# Patient Record
Sex: MALE | Race: WHITE
[De-identification: names, ages, dates, MRNs, and addresses within clinical notes are randomized per-mention and may not be internally consistent; named-entity substitution may affect disease eponyms.]

---

## 2017-05-19 ENCOUNTER — HOSPITAL ENCOUNTER (EMERGENCY)
Dept: HOSPITAL 75 - ER | Age: 32
Discharge: HOME | End: 2017-05-19
Payer: SELF-PAY

## 2017-05-19 VITALS — DIASTOLIC BLOOD PRESSURE: 72 MMHG | SYSTOLIC BLOOD PRESSURE: 132 MMHG

## 2017-05-19 VITALS — HEIGHT: 71 IN | BODY MASS INDEX: 22.4 KG/M2 | WEIGHT: 160 LBS

## 2017-05-19 DIAGNOSIS — F17.210: ICD-10-CM

## 2017-05-19 DIAGNOSIS — M47.817: ICD-10-CM

## 2017-05-19 DIAGNOSIS — M54.5: Primary | ICD-10-CM

## 2017-05-19 PROCEDURE — 72131 CT LUMBAR SPINE W/O DYE: CPT

## 2017-05-19 PROCEDURE — 99281 EMR DPT VST MAYX REQ PHY/QHP: CPT

## 2017-05-19 PROCEDURE — 96372 THER/PROPH/DIAG INJ SC/IM: CPT

## 2017-05-19 NOTE — DIAGNOSTIC IMAGING REPORT
PROCEDURE: CT lumbar spine without contrast.



TECHNIQUE: Multiple contiguous axial images were obtained through

the lumbar spine without the use of intravenous contrast.

Sagittal and coronal reformations were then performed.



INDICATION: Injury, back pain.



There are no prior studies available for comparison.



FINDINGS: The reconstructed sagittal images show the vertebral

body heights and alignment to be generally within normal limits.

There is narrowing of the disc space at L5-S1. There is also a

broad-based disc bulge centrally at this level. The disc flattens

the ventral aspect of the thecal sac and narrows the AP diameter

to approximately 11.8 mm. There also appears to be some narrowing

of the neuroforamen bilaterally.



There is also a disc bulge centrally at the L4-L5 level. The AP

diameter of the thecal sac is narrowed to approximately 11 mm.

There is mild narrowing of the neuroforamen bilaterally at this

level.



The remainder of the lumbar spine is unremarkable for spinal

stenosis or nerve root encroachment.



There is no fracture or acute bony abnormality evident.



There is no sign of a paraspinal mass.



IMPRESSION:

1. There is no evidence for an acute bony abnormality.

2. There is degenerative disc disease at L5-S1 and to a lesser

extent at L4-L5. There is no evidence for central stenosis at

either of these two levels but there is narrowing of the

neuroforamen bilaterally at L5-S1.

3. If further imaging of the lumbar spine is desired, then MRI

would be recommended.



Dictated by: 



  Dictated on workstation # [id number]

## 2017-05-19 NOTE — ED BACK PAIN
General


Chief Complaint:  Back Problems


Stated Complaint:  BACK PAIN


Nursing Triage Note:  


WAS PICKING UP TIRES. FELT A POP, THEN SHARP PAIN TO LOWER BACK.


Nursing Sepsis Screen:  No Definite Risk


Source of Information:  Patient


Exam Limitations:  No Limitations





History of Present Illness


Time Seen by Provider:  16:04


Initial Comments


States he was picking up tires yesterday afternoon at 1 p.m.  Upon lifting his 

fourth tire he felt a popping sensation in his low back and is unable to stand 

up straight due to the pain.  No loss of sensation of his genitals.  No loss of 

bowel or bladder control.  Pain does radiate down the posterior right thigh.  

Never had any history of back problems with this pain before.


Location:  Lumbar Spine


Timing/Duration:  2-3 Days


Severity:  Moderate


Associated Symptoms:  denies symptoms





Allergies and Home Medications


Allergies


Coded Allergies:  


     No Known Drug Allergies (Unverified , 5/19/17)





Home Medications


No Active Prescriptions or Reported Meds





Constitutional:  see HPI, No chills, No fever


EENTM:  see HPI


Respiratory:  no symptoms reported


Cardiovascular:  no symptoms reported


Genitourinary:  no symptoms reported


Musculoskeletal:  see HPI, back pain


Skin:  no symptoms reported


Psychiatric/Neurological:  No Symptoms Reported





Past Medical-Social-Family Hx


Patient Social History


Alcohol Use:  Denies Use


Recreational Drug Use:  Yes


Drug of Choice:  MARIJUANA


Smoking Status:  Current Everyday Smoker


Recent Foreign Travel:  No


Contact w/Someone Who Travel:  No


Recent Infectious Disease Expo:  No


Recent Hopitalizations:  No





Physical Exam


Vital Signs





Vital Sign - Last 12Hours








 5/19/17





 14:43


 


Temp 99.1


 


Pulse 97


 


Resp 18


 


Pulse Ox 96


 


O2 Delivery Room Air





Capillary Refill : Less Than 3 Seconds


General Appearance:  No Apparent Distress, WD/WN


HEENT:  PERRL/EOMI, TMs Normal


Neck:  Full Range of Motion, Normal Inspection


Respiratory:  Normal Breath Sounds, No Accessory Muscle Use, No Respiratory 

Distress


Gastrointestinal:  Normal Bowel Sounds, Non Tender, Soft


Neurologic/Psychiatric:  Alert, Oriented x3, No Motor/Sensory Deficits


Skin:  Normal Color, Warm/Dry





Progress/Results/Core Measures


Results/Orders


My Orders





Orders - MARGE GARCIA


Ct Lumbar Spine Wo (5/19/17 15:57)


Ketorolac Injection (Toradol Injection) (5/19/17 16:00)


Morphine  Injection (Morphine  Injection (5/19/17 16:00)





Medications Given in ED





Current Medications








 Medications  Dose


 Ordered  Sig/Nayla


 Route  Start Time


 Stop Time Status Last Admin


Dose Admin


 


 Ketorolac


 Tromethamine  60 mg  ONCE  ONCE


 IM  5/19/17 16:00


 5/19/17 16:01 DC 5/19/17 16:30


60 MG


 


 Morphine Sulfate  8 mg  ONCE  ONCE


 IM  5/19/17 16:00


 5/19/17 16:01 DC 5/19/17 16:30


8 MG








Vital Signs/I&O





Vital Sign - Last 12Hours








 5/19/17





 14:43


 


Temp 99.1


 


Pulse 97


 


Resp 18


 


B/P (MAP) 


 


Pulse Ox 96


 


O2 Delivery Room Air











Departure


Impression


Impression:  


 Primary Impression:  


 Acute low back pain


Disposition:  01 HOME, SELF-CARE


Condition:  Stable





Departure-Patient Inst.


Decision time for Depature:  17:14


Referrals:  


NO,LOCAL PHYSICIAN (PCP/Family)


Primary Care Physician


Patient Instructions:  Low Back Pain  (DC)





Add. Discharge Instructions:  


1. Return to ER for  any concerns


2. Medication as directed


3. 


All discharge instructions reviewed with patient and/or family. Voiced 

understanding.


Scripts


Cyclobenzaprine HCl (Cyclobenzaprine HCl) 5 Mg Tablet


5 MG PO TID Y for PAIN-MODERATE, #21 TAB


   Prov: MARGE GARCIA         5/19/17











MARGE GARCIA May 19, 2017 16:06

## 2018-04-09 ENCOUNTER — HOSPITAL ENCOUNTER (EMERGENCY)
Dept: HOSPITAL 75 - ER | Age: 33
Discharge: HOME | End: 2018-04-09
Payer: SELF-PAY

## 2018-04-09 VITALS — WEIGHT: 165 LBS | BODY MASS INDEX: 23.1 KG/M2 | HEIGHT: 71 IN

## 2018-04-09 VITALS — DIASTOLIC BLOOD PRESSURE: 72 MMHG | SYSTOLIC BLOOD PRESSURE: 136 MMHG

## 2018-04-09 DIAGNOSIS — S82.831A: Primary | ICD-10-CM
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
## 2018-04-09 DIAGNOSIS — F12.90: ICD-10-CM

## 2018-04-09 DIAGNOSIS — F17.210: ICD-10-CM

## 2018-04-09 PROCEDURE — 73610 X-RAY EXAM OF ANKLE: CPT

## 2018-04-09 PROCEDURE — 96374 THER/PROPH/DIAG INJ IV PUSH: CPT

## 2018-04-09 NOTE — DIAGNOSTIC IMAGING REPORT
INDICATION: Rolled right ankle.



TIME OF EXAMINATION: 01:59 p.m.



FINDINGS: Three views of the right ankle demonstrate an obliquely

oriented fracture of the distal fibula. No significant

displacement or angulation is seen. The ankle mortise is well

maintained. The talar dome is smooth. No other fractures are

seen. There is mild soft tissue swelling about the ankle.



IMPRESSION: Acute nondisplaced obliquely oriented fracture of the

distal fibula.



Dictated by: 



  Dictated on workstation # [id number]

## 2018-04-09 NOTE — ED LOWER EXTREMITY
General


Chief Complaint:  Lower Extremity


Stated Complaint:  RT ANKLE INJ


Nursing Triage Note:  


PT CO OF R ANKLE PAIN FROM LANDING WRONG ON KICK


Nursing Sepsis Screen:  No Definite Risk


Source:  patient


Exam Limitations:  no limitations





History of Present Illness


Date Seen by Provider:  Apr 9, 2018


Time Seen by Provider:  13:35


Initial Comments


33-year-old male patient presents to the emergency department with complaints 

of right ankle pain after landing wrong on a spin kick.


Onset:  just prior to arrival


Pain/Injury Location:  right ankle


Method of Injury:  other (landed wrong on a spin kick)


Modifying Factors:  Improves With Immobilization, Worse With Movement, Worse 

With Other (unable to bear weight on the RLE due to rt ankle pain.)





Allergies and Home Medications


Allergies


Coded Allergies:  


     No Known Drug Allergies (Unverified , 5/19/17)





Home Medications


Cyclobenzaprine HCl 5 Mg Tablet, 5 MG PO TID PRN for PAIN-MODERATE


   Prescribed by: MARGE GARCIA on 5/19/17 1754


Hydrocodone/Acetaminophen 1 Each Tablet, 1 EACH PO Q4H PRN for pain


   Prescribed by: BHARTI HERNANDEZ on 4/9/18 1406





Patient Home Medication List


Home Medication List Reviewed:  Yes





Constitutional:  no symptoms reported


Respiratory:  no symptoms reported


Cardiovascular:  no symptoms reported


Musculoskeletal:  see HPI, No back pain, joint pain (right ankle pain), joint 

swelling (right ankle swelling), No neck pain


Skin:  change in color (bruising to the right ankle)


Psychiatric/Neurological:  Denies Headache, Denies Numbness, Denies Paresthesia

, Denies Tingling, Denies Weakness


All Other Systems Reviewed


Negative Unless Noted:  Yes (Negative excepted noted.)





Past Medical-Social-Family Hx


Patient Social History


Alcohol Use:  Denies Use


Recreational Drug Use:  Yes


Drug of Choice:  MARIJUANA


Smoking Status:  Current Everyday Smoker


Type Used:  Cigarettes


Recent Foreign Travel:  No


Contact w/Someone Who Travel:  No


Recent Infectious Disease Expo:  No


Recent Hopitalizations:  No


Physical Abuse:  No


Sexual Abuse:  No





Past Medical History


Surgeries:  No


Respiratory:  No


Cardiac:  No


Neurological:  No


Genitourinary:  No


Gastrointestinal:  No


Musculoskeletal:  No


Endocrine:  No


HEENT:  No


Cancer:  No


Psychosocial:  No


Nursing Suicide Risk Score:  0


Integumentary:  No


Blood Disorders:  No





Family Medical History


Reviewed Nursing Family Hx


No Pertinent Family Hx





Physical Exam


Vital Signs





Vital Signs - First Documented








 4/9/18





 13:35


 


Temp 98.1


 


Pulse 103


 


Resp 20


 


B/P (MAP) 147/93 (111)


 


Pulse Ox 100





Capillary Refill : Less Than 3 Seconds


General Appearance:  WD/WN, no apparent distress


Cardiovascular:  normal peripheral pulses, regular rate, rhythm, no murmur


Respiratory:  lungs clear, normal breath sounds, no respiratory distress, no 

accessory muscle use


Hips:  bilateral hip non-tender, bilateral hip normal inspection, bilateral hip 

normal range of motion, bilateral hip no evidence of injury


Legs:  bilateral leg non-tender, bilateral leg normal inspection, bilateral leg 

normal range of motion, bilateral leg no evidence of injury


Knees:  bilateral knee non-tender, bilateral knee normal inspection, bilateral 

knee normal range of motion, bilateral knee no evidence of injury


Ankles:  left ankle non-tender, left ankle normal inspection, left ankle normal 

range of motion, left ankle no evidence of injury, right ankle bone tenderness (

lateral right ankle bony tenderness), right ankle ecchymosis (right ankle 

ecchymosis), right ankle limited range of motion, right ankle pain, right ankle 

soft tissue tenderness, right ankle swelling


Feet:  bilateral foot non-tender, bilateral foot normal inspection, bilateral 

foot normal range of motion, bilateral foot no evidence of injury


Neurologic/Tendon:  normal sensation, normal motor functions, normal tendon 

functions, responds to pain, no evidence tendon injury


Neurologic/Psychiatric:  no motor/sensory deficits, alert, normal mood/affect, 

oriented x 3


Skin:  normal color, warm/dry, ecchymosis (right ankle ecchymosis)





Progress/Results/Core Measures


My Orders





Orders - BHARTI HERNANDEZ


Ankle, Right, 3 Views (4/9/18 13:38)


Morphine  Injection (Morphine  Injection (4/9/18 13:40)


Saline Lock/Iv-Start (4/9/18 13:40)


Morphine  Injection (Morphine  Injection (4/9/18 13:38)


Crutches (4/9/18 14:14)


Steplite (4/9/18 14:14)





Vital Signs/I&O











 4/9/18 4/9/18





 13:35 14:43


 


Temp 98.1 


 


Pulse 103 88


 


Resp 20 20


 


B/P (MAP) 147/93 (111) 136/72 (111)


 


Pulse Ox 100 100














Blood Pressure Mean:  111











   Diagonstic Imaging:  Xray


   Plain Films/CT/US/NM/MRI:  ankle


Comments


TIME OF EXAMINATION: 01:59 p.m. FINDINGS: Three views of the right ankle 

demonstrate an obliquely oriented fracture of the distal fibula. No significant 

displacement or angulation is seen. The ankle mortise is well maintained. The 

talar dome is smooth. No other fractures are seen. There is mild soft tissue 

swelling about the ankle. IMPRESSION: Acute nondisplaced obliquely oriented 

fracture of the distal fibula. Dictated on workstation # [id number]


   Reviewed:  Reviewed by Me (radiology report reviewed by me)





Departure


Communication (Admissions)


Diagnostic findings discussed with the patient.  patient placed in a steplite 

boot and given crutches.  dsch to home.  Patient to contact Dr. Denson's office 

for a follow-up appointment within the next 7 days.





Impression





 Primary Impression:  


 Closed fracture of right distal fibula


 Qualified Codes:  S82.831A - Other fracture of upper and lower end of right 

fibula, initial encounter for closed fracture


Disposition:  01 HOME, SELF-CARE


Condition:  Improved





Departure-Patient Inst.


Decision time for Depature:  14:05


Referrals:  


JORGE DENSON,LOCAL PHYSICIAN (PCP)


Primary Care Physician


Patient Instructions:  Ankle Fracture (DC)





Add. Discharge Instructions:  


All discharge instructions reviewed with patient and/or family. Voiced 

understanding.  Medications as instructed.  No ibuprofen or Aleve.  Elevate the 

right ankle on pillows above the level of the heart.  Ice pack for 20 minute 

intervals as needed for pain and swelling.  Wear the step light boot as 

instructed.  You may remove the boot to shower.  Crutches as instructed.  Non-

weight bearing on the right lower extremity until released by Dr. Denson.  Follow

-up with Dr. Denson within the next 7 days for recheck.  Call for appointment 

time.  Return to the emergency department for worsened symptoms or any other 

concerns.


Scripts


Hydrocodone/Acetaminophen (Hydrocodone-Acetamin 7.5-325) 1 Each Tablet


1 EACH PO Q4H Y for pain, #20 TAB 0 Refills


   Prov: BHARTI HERNANDEZ         4/9/18


Work/School Note:  Work Release Form   Date Seen in the Emergency Department:  

Apr 9, 2018


   Return to Work:  Apr 11, 2018


      Other Restrictions Listed Below:  non-weight bearing on the rt lower 

extremity until released by Dr. Denson.











BHARTI HERNANDEZ Apr 9, 2018 14:00

## 2018-04-19 ENCOUNTER — HOSPITAL ENCOUNTER (EMERGENCY)
Dept: HOSPITAL 75 - ER | Age: 33
Discharge: HOME | End: 2018-04-19
Payer: SELF-PAY

## 2018-04-19 VITALS — DIASTOLIC BLOOD PRESSURE: 91 MMHG | SYSTOLIC BLOOD PRESSURE: 145 MMHG

## 2018-04-19 VITALS — WEIGHT: 160 LBS | BODY MASS INDEX: 22.4 KG/M2 | HEIGHT: 71 IN
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
## 2018-04-19 DIAGNOSIS — F17.210: ICD-10-CM

## 2018-04-19 DIAGNOSIS — S82.831D: Primary | ICD-10-CM

## 2018-04-19 DIAGNOSIS — F12.10: ICD-10-CM

## 2018-04-19 PROCEDURE — 99281 EMR DPT VST MAYX REQ PHY/QHP: CPT

## 2018-04-19 NOTE — ED GENERAL
General


Chief Complaint:  General Problems/Pain


Stated Complaint:  R ANKLE BREAK PAIN


Nursing Triage Note:  


ARRIVED VIA AMB WITH CRUTCHES.  WAS SEEN HERE ON MONDAY AND DX WITH A BROKEN 


RIGHT TIBIA.  WAS SEEN AT [facility] TODAY AND PRESCRIBED HYDROCODONE BUT DOES NOT 


THINK IT WILL LAST HIM TILL MONDAY.


Nursing Sepsis Screen:  No Definite Risk


Source of Information:  Patient


Exam Limitations:  No Limitations





History of Present Illness


Date Seen by Provider:  Apr 19, 2018


Time Seen by Provider:  16:46


Initial Comments


The patient is a 33-year-old white male seen here 10 days ago after an ankle 

injury suffered while doing martial arts.  He was placed in a short leg 

immobilizer.  Arrangements had been made for him to see Dr. Coy of [address].  This apparently did not come off as he required a referral from 

[facility] in order to be seen.  He was seen by [facility] 

yesterday and given a prescription for 5 hydrocodone tablets for pain.  He did 

not believe this would be enough to hold him through the weekend.  He was 

informed that we were unable to give him additional narcotics by policy.  He 

had been told by others that he would require surgery on his ankle.  A review 

of his x-ray showed an obliquely oriented distal fibular fracture.  It was 

explained to him that he was unlikely to require surgery because of the distal 

fibular that injury but that he would require orthopedics to make that judgment.





Allergies and Home Medications


Allergies


Coded Allergies:  


     No Known Drug Allergies (Unverified , 5/19/17)





Home Medications


Cyclobenzaprine HCl 5 Mg Tablet, 5 MG PO TID PRN for PAIN-MODERATE


   Prescribed by: MARGE GARCIA on 5/19/17 1754


Hydrocodone/Acetaminophen 1 Each Tablet, 1 EACH PO Q4H PRN for pain


   Prescribed by: BHARTI HERNANDEZ on 4/9/18 1406





Patient Home Medication List


Home Medication List Reviewed:  Yes





Review of Systems


Constitutional:  see HPI


An orthopedic ankle immobilizer device was no noted in place.  There was no 

swelling above or below the device.  It was not removed.





Past Medical-Social-Family Hx


Patient Social History


Alcohol Use:  Denies Use


Recreational Drug Use:  Yes


Drug of Choice:  POT


Smoking Status:  Current Everyday Smoker


Type Used:  Cigarettes


Recent Foreign Travel:  No


Contact w/Someone Who Travel:  No


Recent Infectious Disease Expo:  No


Recent Hopitalizations:  No





Past Medical History


Surgeries:  No


Respiratory:  No


Cardiac:  No


Neurological:  No


Genitourinary:  No


Gastrointestinal:  No


Musculoskeletal:  No


Endocrine:  No


HEENT:  No


Cancer:  No


Psychosocial:  No


Integumentary:  No


Blood Disorders:  No





Family Medical History


No Pertinent Family Hx





Physical Exam


Vital Signs





Vital Signs - First Documented








 4/19/18





 15:56


 


Temp 98.0


 


Pulse 113


 


Resp 18


 


B/P (MAP) 145/91 (109)


 


Pulse Ox 96


 


O2 Delivery Room Air





Capillary Refill : Less Than 3 Seconds


General Appearance:  No Apparent Distress, WD/WN, Other (walking with a ankle 

immobilizer and walker)


Respiratory:  Chest Non Tender, Lungs Clear, Normal Breath Sounds, No Accessory 

Muscle Use, No Respiratory Distress


Cardiovascular:  Regular Rate, Rhythm, No Edema, No Gallop, No JVD, No Murmur, 

Normal Peripheral Pulses


Comments


No swelling proximal or distal to the orthopedic device





Progress/Results/Core Measures


Suspected Sepsis


Recent Fever Within 48 Hours:  No


Infection Criteria Present:  None


New/Unexplained  Altered Menta:  No


Sepsis Screen:  No Definite Risk


SIRS


Temperature:98.0 


Pulse: 113 


Respiratory Rate: 18


 


Blood Pressure 145 /91 


Mean: 109





Results/Orders


Vital Signs/I&O











 4/19/18





 15:56


 


Temp 98.0


 


Pulse 113


 


Resp 18


 


B/P (MAP) 145/91 (109)


 


Pulse Ox 96


 


O2 Delivery Room Air





Capillary Refill : Less Than 3 Seconds








Blood Pressure Mean:  109











Departure


Impression





 Primary Impression:  


 non-displaced distal right fibular fracture


Disposition:  01 HOME, SELF-CARE


Condition:  Stable/Unchanged





Departure-Patient Inst.


Decision time for Depature:  16:52


Referrals:  


[facility] (PCP/Family)


Primary Care Physician





Add. Discharge Instructions:  


All discharge instructions reviewed with patient and/or family. Voiced 

understanding.


Keep appointment with the orthopedics











KAVITHA SANCHES MD Apr 19, 2018 16:51

## 2022-01-10 ENCOUNTER — HOSPITAL ENCOUNTER (EMERGENCY)
Dept: HOSPITAL 75 - ER | Age: 37
Discharge: HOME | End: 2022-01-10
Payer: SELF-PAY

## 2022-01-10 VITALS — HEIGHT: 69.69 IN | WEIGHT: 189.6 LBS | BODY MASS INDEX: 27.45 KG/M2

## 2022-01-10 VITALS — DIASTOLIC BLOOD PRESSURE: 83 MMHG | SYSTOLIC BLOOD PRESSURE: 136 MMHG

## 2022-01-10 DIAGNOSIS — F17.210: ICD-10-CM

## 2022-01-10 DIAGNOSIS — R19.7: Primary | ICD-10-CM

## 2022-01-10 LAB
ALBUMIN SERPL-MCNC: 4.3 GM/DL (ref 3.2–4.5)
ALP SERPL-CCNC: 78 U/L (ref 40–136)
ALT SERPL-CCNC: 16 U/L (ref 0–55)
BASOPHILS # BLD AUTO: 0.1 10^3/UL (ref 0–0.1)
BASOPHILS NFR BLD AUTO: 1 % (ref 0–10)
BILIRUB SERPL-MCNC: 0.4 MG/DL (ref 0.1–1)
BUN/CREAT SERPL: 17
CALCIUM SERPL-MCNC: 9.4 MG/DL (ref 8.5–10.1)
CHLORIDE SERPL-SCNC: 106 MMOL/L (ref 98–107)
CO2 SERPL-SCNC: 23 MMOL/L (ref 21–32)
CREAT SERPL-MCNC: 0.89 MG/DL (ref 0.6–1.3)
EOSINOPHIL # BLD AUTO: 2.8 10^3/UL (ref 0–0.3)
EOSINOPHIL NFR BLD AUTO: 22 % (ref 0–10)
EOSINOPHIL NFR BLD MANUAL: 16 %
GFR SERPLBLD BASED ON 1.73 SQ M-ARVRAT: 97 ML/MIN
GLUCOSE SERPL-MCNC: 82 MG/DL (ref 70–105)
HCT VFR BLD CALC: 44 % (ref 40–54)
HGB BLD-MCNC: 15.5 G/DL (ref 13.3–17.7)
LYMPHOCYTES # BLD AUTO: 2.4 10^3/UL (ref 1–4)
LYMPHOCYTES NFR BLD AUTO: 19 % (ref 12–44)
MAGNESIUM SERPL-MCNC: 2.1 MG/DL (ref 1.6–2.4)
MANUAL DIFFERENTIAL PERFORMED BLD QL: YES
MCH RBC QN AUTO: 31 PG (ref 25–34)
MCHC RBC AUTO-ENTMCNC: 35 G/DL (ref 32–36)
MCV RBC AUTO: 89 FL (ref 80–99)
MONOCYTES # BLD AUTO: 0.8 10^3/UL (ref 0–1)
MONOCYTES NFR BLD AUTO: 6 % (ref 0–12)
MONOCYTES NFR BLD: 6 %
NEUTROPHILS # BLD AUTO: 6.7 10^3/UL (ref 1.8–7.8)
NEUTROPHILS NFR BLD AUTO: 52 % (ref 42–75)
NEUTS BAND NFR BLD MANUAL: 63 %
PLATELET # BLD: 218 10^3/UL (ref 130–400)
PMV BLD AUTO: 9.6 FL (ref 9–12.2)
POTASSIUM SERPL-SCNC: 3.5 MMOL/L (ref 3.6–5)
PROT SERPL-MCNC: 7.2 GM/DL (ref 6.4–8.2)
RBC MORPH BLD: NORMAL
SODIUM SERPL-SCNC: 141 MMOL/L (ref 135–145)
VARIANT LYMPHS NFR BLD MANUAL: 15 %
WBC # BLD AUTO: 12.9 10^3/UL (ref 4.3–11)

## 2022-01-10 PROCEDURE — 85027 COMPLETE CBC AUTOMATED: CPT

## 2022-01-10 PROCEDURE — 36415 COLL VENOUS BLD VENIPUNCTURE: CPT

## 2022-01-10 PROCEDURE — 85007 BL SMEAR W/DIFF WBC COUNT: CPT

## 2022-01-10 PROCEDURE — 80053 COMPREHEN METABOLIC PANEL: CPT

## 2022-01-10 PROCEDURE — 83735 ASSAY OF MAGNESIUM: CPT

## 2022-01-10 NOTE — ED ABDOMINAL PAIN
General


Chief Complaint:  Abdominal/GI Problems


Stated Complaint:  DIARRHEA X 7 DAYS


Nursing Triage Note:  


Patient ambulatory to [facility] with c/o diarrhea x 7 days. Patient states the 


diarrhea began after eating tuna with oil. He denies any abdominal pain, nausea 


or vomiting.


Source of Information:  Patient


Exam Limitations:  No Limitations


 (DIANNA ESCAMILLA)





History of Present Illness


Date Seen by Provider:  Rj 10, 2022


Time Seen by Provider:  21:39


Initial Comments


Patient is a 36-year-old male presents ED with diarrhea.  Diarrhea since last 

Monday.  Reports 5-7 episodes daily with any blood or mucus.  No vomiting, 

abdominal pain.  Patient states he did switch tuna last week but switch back 

after Wednesday.  Patient took leftover antibiotics from his primary care 

physician over the past day without much improvement.  No history inflammatory 

bowel disease.  No recent travels or antibiotics use previously.  Patient no 

acute distress.  No fever, runny nose, sore throat, cough, headache, dizziness.


 (DIANNA ESCAMILLA)





Allergies and Home Medications


Allergies


Coded Allergies:  


     No Known Drug Allergies (Unverified , 5/19/17)





Patient Home Medication List


Home Medication List Reviewed:  Yes


 (DIANNA ESCAMILLA)


Cyclobenzaprine HCl (Cyclobenzaprine HCl) 5 Mg Tablet, 5 MG PO TID PRN for PAIN-

MODERATE


   Prescribed by: MARGE GARCIA on 5/19/17 1754


Hydrocodone Bit/Acetaminophen (HYDROcodone/APAP 7.5/325 TAB) 1 Each Tablet, 1 

EACH PO Q4H PRN for pain


   Prescribed by: BHARTI HERNANDEZ on 4/9/18 1406





Review of Systems


Review of Systems


Constitutional:  No chills, No diaphoresis, No dizziness, No fever


EENTM:  No Blurred Vision, No Eye Tearing, No Ear Drainage


Respiratory:  Denies Cough, Denies Orthopnea


Cardiovascular:  Denies Chest Pain


Gastrointestinal:  Denies Abdomen Distended, Denies Abdominal Pain; Diarrhea; 

Denies Nausea, Denies Vomiting


Genitourinary:  Denies Burning, Denies Discharge


Musculoskeletal:  No back pain, No joint pain


Skin:  No change in color, No change in hair/nails


Psychiatric/Neurological:  Denies Headache, Denies Numbness, Denies Weakness 

(DIANNA ESCAMILLA)





All Other Systems Reviewed


Negative Unless Noted:  Yes


 (DIANNA ESCAMILLA)





Past Medical-Social-Family Hx


Patient Social History


Tobacco Use?:  Yes


Tobacco type used:  Cigarettes


Smoking Status:  Current Everyday Smoker


Smokeless Tobacco Frequency:  Never a User


Use of E-Cig and/or Vaping dev:  No


Use of E-Cig and/or Vaping Antonio:  Never a User


Substance use?:  No


Alcohol Use?:  No


Pt feels they are or have been:  No


 (DIANNA ESCAMILLA)





Immunizations Up To Date


First/Initial COVID19 Vaccinat:  March


Second COVID19 Vaccination Chai:  March


COVID19 Vaccine :  Lexis


 (DIANNA ESCAMILLA)





Past Medical History


Surgery/Hospitalization HX:  


none


Surgeries:  No


Respiratory:  No


Cardiac:  No


Neurological:  No


Genitourinary:  No


Gastrointestinal:  No


Musculoskeletal:  No


Endocrine:  No


HEENT:  No


Cancer:  No


Psychosocial:  No


Integumentary:  No


Blood Disorders:  No


 (DIANNA ESCAMILLA)





Family Medical History


No Pertinent Family Hx


 (DIANNA ESCAMILLA)





Physical Exam


Vital Signs





Vital Signs - First Documented








 1/10/22





 21:16


 


Temp 36.7


 


Pulse 65


 


Resp 16


 


B/P (MAP) 131/89 (103)


 


Pulse Ox 99


 


O2 Delivery Room Air








 (YORDAN BROWN MD)


Vital Signs


Capillary Refill : Less Than 3 Seconds 


 (DIANNA ESCAMILLA)


Height/Weight/BMI


Height: 5'11.00"


Weight: 160lbs. oz. 72.929865bh; 27.00 BMI


Method:Stated


General Appearance:  WD/WN, no apparent distress


HEENT:  PERRL/EOMI, normal ENT inspection, TMs normal, pharynx normal


Neck:  non-tender, full range of motion, supple, normal inspection


Respiratory:  chest non-tender, lungs clear, normal breath sounds, no r

espiratory distress, no accessory muscle use


Cardiovascular:  regular rate, rhythm, no edema, no gallop


Gastrointestinal:  normal bowel sounds, non tender, soft, no organomegaly, no 

pulsatile mass


Extremities:  normal range of motion, non-tender, normal inspection, no pedal 

edema, no calf tenderness


Back:  normal inspection, no CVA tenderness, no vertebral tenderness


Pelvic:  normal external exam, normal adnexa, no cerv. motion tender, no masses


Neurologic/Psychiatric:  CNs II-XII nml as tested, no motor/sensory deficits, 

alert, normal mood/affect, oriented x 3


Skin:  normal color, warm/dry (DIANNA ESCAMILLA)





Progress/Results/Core Measures


Results/Orders


Lab Results





Laboratory Tests








Test


 1/10/22


21:25 Range/Units


 


 


White Blood Count


 12.9 H


 4.3-11.0


10^3/uL


 


Red Blood Count


 4.94 


 4.30-5.52


10^6/uL


 


Hemoglobin 15.5  13.3-17.7  g/dL


 


Hematocrit 44  40-54  %


 


Mean Corpuscular Volume 89  80-99  fL


 


Mean Corpuscular Hemoglobin 31  25-34  pg


 


Mean Corpuscular Hemoglobin


Concent 35 


 32-36  g/dL





 


Red Cell Distribution Width 12.4  10.0-14.5  %


 


Platelet Count


 218 


 130-400


10^3/uL


 


Mean Platelet Volume 9.6  9.0-12.2  fL


 


Immature Granulocyte % (Auto) 0   %


 


Neutrophils (%) (Auto) 52  42-75  %


 


Lymphocytes (%) (Auto) 19  12-44  %


 


Monocytes (%) (Auto) 6  0-12  %


 


Eosinophils (%) (Auto) 22 H 0-10  %


 


Basophils (%) (Auto) 1  0-10  %


 


Neutrophils # (Auto)


 6.7 


 1.8-7.8


10^3/uL


 


Lymphocytes # (Auto)


 2.4 


 1.0-4.0


10^3/uL


 


Monocytes # (Auto)


 0.8 


 0.0-1.0


10^3/uL


 


Eosinophils # (Auto)


 2.8 H


 0.0-0.3


10^3/uL


 


Basophils # (Auto)


 0.1 


 0.0-0.1


10^3/uL


 


Immature Granulocyte # (Auto)


 0.0 


 0.0-0.1


10^3/uL


 


Neutrophils % (Manual) 63   %


 


Lymphocytes % (Manual) 15   %


 


Monocytes % (Manual) 6   %


 


Eosinophils % (Manual) 16   %


 


Blood Morphology Comment NORMAL   


 


Sodium Level 141  135-145  MMOL/L


 


Potassium Level 3.5 L 3.6-5.0  MMOL/L


 


Chloride Level 106    MMOL/L


 


Carbon Dioxide Level 23  21-32  MMOL/L


 


Anion Gap 12  5-14  MMOL/L


 


Blood Urea Nitrogen 15  7-18  MG/DL


 


Creatinine


 0.89 


 0.60-1.30


MG/DL


 


Estimat Glomerular Filtration


Rate 97 


  





 


BUN/Creatinine Ratio 17   


 


Glucose Level 82    MG/DL


 


Calcium Level 9.4  8.5-10.1  MG/DL


 


Corrected Calcium 9.2  8.5-10.1  MG/DL


 


Magnesium Level 2.1  1.6-2.4  MG/DL


 


Total Bilirubin 0.4  0.1-1.0  MG/DL


 


Aspartate Amino Transf


(AST/SGOT) 14 


 5-34  U/L





 


Alanine Aminotransferase


(ALT/SGPT) 16 


 0-55  U/L





 


Alkaline Phosphatase 78    U/L


 


Total Protein 7.2  6.4-8.2  GM/DL


 


Albumin 4.3  3.2-4.5  GM/DL





 (YORDAN BROWN MD)


My Orders





Orders - YORDAN BROWN MD


Cbc With Automated Diff (1/10/22 20:36)


Comprehensive Metabolic Panel (1/10/22 20:36)


Magnesium (1/10/22 20:36)


Ed Iv/Invasive Line Start (1/10/22 20:36)


Lactated Ringers (Lr 1000 Ml Iv Solution (1/10/22 20:45)


Manual Differential (1/10/22 21:25)


 (YORDAN BROWN MD)


Medications Given in ED





Current Medications








 Medications  Dose


 Ordered  Sig/Nayla


 Route  Start Time


 Stop Time Status Last Admin


Dose Admin


 


 Lactated Ringer's  1,000 ml @ 


 0 mls/hr  Q0M ONCE


 IV  1/10/22 20:45


 1/10/22 20:46 DC 1/10/22 21:25


1,000 MLS/HR





 (YORDAN BROWN MD)


Vital Signs/I&O











 1/10/22 1/10/22





 21:16 23:06


 


Temp 36.7 


 


Pulse 65 65


 


Resp 16 14


 


B/P (MAP) 131/89 (103) 136/83


 


Pulse Ox 99 98


 


O2 Delivery Room Air Room Air





 (YORDAN BROWN MD)








Blood Pressure Mean:                    103











Departure


Communication (Admissions)


Patient presents ED for diarrhea since last Monday.  Reports 4-5 episodes daily 

without any blood or mucus.  No fever.  He states he had flulike symptoms the 

week prior but that has improved.  He has no abdominal pain.  Urinating without 

any difficulties.  Vital signs stable.  Soft abdomen.  Lab work shows slightly 

elevated white blood count and eosinophil.  No family history of inflammatory 

bowel disease.  Denies of any known allergies.  No recent travels.  No recent 

antibiotic use.  Patient tolerated IV fluids.  No diarrhea here.  Discussed all 

results with patient.  Discussed probiotics.  Discussed brat diet.  May consider

loperamide since the patient is afebrile without evidence dysentery as he denies

of any blood however outpatient stool culture if symptoms progress to rule out 

any infectious etiology should be considered and ruled out.  If any worsening 

symptoms return back to ED for further evaluation.  Patient does not appear 

toxic or septic.  Lab work otherwise unremarkable besides slightly elevated 

white blood count 12.9 and slight elevated eosinophil count


 (DIANNA ESCAMILLA)





Impression





   Primary Impression:  


   Diarrhea


Disposition:  01 HOME, SELF-CARE


Condition:  Stable





Departure-Patient Inst.


Decision time for Depature:  22:58


 (DIANNA ESCAMILLA)


Referrals:  


[facility]LOCAL PHYSICIAN (PCP)


Primary Care Physician


Patient Instructions:  Diarrhea, Adult ED








ATTENDING PHYSICIAN NOTE:


I was physically present as attending physician in the emergency department 

during the care of this patient, but I was not directly involved in the decision

making or delivery of care for this patient. 


 (YORDAN BROWN MD)











DIANNA ESCAMILLA          Rj 10, 2022 21:42


YORDAN BROWN MD        Jan 11, 2022 07:31

## 2022-06-27 ENCOUNTER — HOSPITAL ENCOUNTER (EMERGENCY)
Dept: HOSPITAL 75 - ER | Age: 37
Discharge: HOME | End: 2022-06-27
Payer: SELF-PAY

## 2022-06-27 VITALS — HEIGHT: 70 IN | BODY MASS INDEX: 23.45 KG/M2 | WEIGHT: 163.8 LBS

## 2022-06-27 VITALS — SYSTOLIC BLOOD PRESSURE: 149 MMHG | DIASTOLIC BLOOD PRESSURE: 90 MMHG

## 2022-06-27 DIAGNOSIS — F17.210: ICD-10-CM

## 2022-06-27 DIAGNOSIS — J18.9: Primary | ICD-10-CM

## 2022-06-27 DIAGNOSIS — Z20.822: ICD-10-CM

## 2022-06-27 LAB
ALBUMIN SERPL-MCNC: 4.6 GM/DL (ref 3.2–4.5)
ALP SERPL-CCNC: 64 U/L (ref 40–136)
ALT SERPL-CCNC: 17 U/L (ref 0–55)
AMYLASE SERPL-CCNC: 63 U/L (ref 25–125)
APAP SERPL-MCNC: < 10 UG/ML (ref 10–30)
APTT BLD: 30 SEC (ref 24–35)
APTT PPP: YELLOW S
BACTERIA #/AREA URNS HPF: NEGATIVE /HPF
BARBITURATES UR QL: NEGATIVE
BASOPHILS # BLD AUTO: 0.1 10^3/UL (ref 0–0.1)
BASOPHILS NFR BLD AUTO: 1 % (ref 0–10)
BENZODIAZ UR QL SCN: NEGATIVE
BILIRUB SERPL-MCNC: 0.7 MG/DL (ref 0.1–1)
BILIRUB UR QL STRIP: NEGATIVE
BUN/CREAT SERPL: 9
CALCIUM SERPL-MCNC: 9.5 MG/DL (ref 8.5–10.1)
CHLORIDE SERPL-SCNC: 106 MMOL/L (ref 98–107)
CK MB SERPL-MCNC: 1 NG/ML (ref ?–6.6)
CK SERPL-CCNC: 125 U/L (ref 30–200)
CO2 SERPL-SCNC: 24 MMOL/L (ref 21–32)
COCAINE UR QL: NEGATIVE
CREAT SERPL-MCNC: 1.11 MG/DL (ref 0.6–1.3)
D DIMER PPP FEU-MCNC: < 0.27 UG/ML (ref 0–0.49)
EOSINOPHIL # BLD AUTO: 0.1 10^3/UL (ref 0–0.3)
EOSINOPHIL NFR BLD AUTO: 1 % (ref 0–10)
ERYTHROCYTE [SEDIMENTATION RATE] IN BLOOD: 1 MM/HR (ref 0–15)
FIBRINOGEN PPP-MCNC: CLEAR MG/DL
GFR SERPLBLD BASED ON 1.73 SQ M-ARVRAT: 88 ML/MIN
GLUCOSE SERPL-MCNC: 95 MG/DL (ref 70–105)
GLUCOSE UR STRIP-MCNC: NEGATIVE MG/DL
HCT VFR BLD CALC: 49 % (ref 40–54)
HGB BLD-MCNC: 16.6 G/DL (ref 13.3–17.7)
INR PPP: 1 (ref 0.8–1.4)
KETONES UR QL STRIP: NEGATIVE
LEUKOCYTE ESTERASE UR QL STRIP: NEGATIVE
LIPASE SERPL-CCNC: 14 U/L (ref 8–78)
LYMPHOCYTES # BLD AUTO: 2 10^3/UL (ref 1–4)
LYMPHOCYTES NFR BLD AUTO: 20 % (ref 12–44)
MAGNESIUM SERPL-MCNC: 2.2 MG/DL (ref 1.6–2.4)
MANUAL DIFFERENTIAL PERFORMED BLD QL: NO
MCH RBC QN AUTO: 31 PG (ref 25–34)
MCHC RBC AUTO-ENTMCNC: 34 G/DL (ref 32–36)
MCV RBC AUTO: 90 FL (ref 80–99)
METHADONE UR QL SCN: NEGATIVE
MONOCYTES # BLD AUTO: 0.6 10^3/UL (ref 0–1)
MONOCYTES NFR BLD AUTO: 6 % (ref 0–12)
NEUTROPHILS # BLD AUTO: 7.3 10^3/UL (ref 1.8–7.8)
NEUTROPHILS NFR BLD AUTO: 72 % (ref 42–75)
NITRITE UR QL STRIP: NEGATIVE
OPIATES UR QL SCN: NEGATIVE
OXYCODONE UR QL: NEGATIVE
PH UR STRIP: 8.5 [PH] (ref 5–9)
PLATELET # BLD: 241 10^3/UL (ref 130–400)
PMV BLD AUTO: 9.9 FL (ref 9–12.2)
POTASSIUM SERPL-SCNC: 3.8 MMOL/L (ref 3.6–5)
PROPOXYPH UR QL: NEGATIVE
PROT SERPL-MCNC: 7.4 GM/DL (ref 6.4–8.2)
PROT UR QL STRIP: NEGATIVE
PROTHROMBIN TIME: 13.2 SEC (ref 12.2–14.7)
RBC #/AREA URNS HPF: (no result) /HPF
SODIUM SERPL-SCNC: 140 MMOL/L (ref 135–145)
SP GR UR STRIP: 1.02 (ref 1.02–1.02)
SQUAMOUS #/AREA URNS HPF: (no result) /HPF
TRICYCLICS UR QL SCN: NEGATIVE
TSH SERPL DL<=0.05 MIU/L-ACNC: 0.53 UIU/ML (ref 0.35–4.94)
WBC # BLD AUTO: 10 10^3/UL (ref 4.3–11)
WBC #/AREA URNS HPF: (no result) /HPF

## 2022-06-27 PROCEDURE — 80306 DRUG TEST PRSMV INSTRMNT: CPT

## 2022-06-27 PROCEDURE — 84443 ASSAY THYROID STIM HORMONE: CPT

## 2022-06-27 PROCEDURE — 85610 PROTHROMBIN TIME: CPT

## 2022-06-27 PROCEDURE — 85379 FIBRIN DEGRADATION QUANT: CPT

## 2022-06-27 PROCEDURE — 81000 URINALYSIS NONAUTO W/SCOPE: CPT

## 2022-06-27 PROCEDURE — 84484 ASSAY OF TROPONIN QUANT: CPT

## 2022-06-27 PROCEDURE — 82550 ASSAY OF CK (CPK): CPT

## 2022-06-27 PROCEDURE — 85025 COMPLETE CBC W/AUTO DIFF WBC: CPT

## 2022-06-27 PROCEDURE — 85652 RBC SED RATE AUTOMATED: CPT

## 2022-06-27 PROCEDURE — 93041 RHYTHM ECG TRACING: CPT

## 2022-06-27 PROCEDURE — 84145 PROCALCITONIN (PCT): CPT

## 2022-06-27 PROCEDURE — 83735 ASSAY OF MAGNESIUM: CPT

## 2022-06-27 PROCEDURE — 82553 CREATINE MB FRACTION: CPT

## 2022-06-27 PROCEDURE — 83874 ASSAY OF MYOGLOBIN: CPT

## 2022-06-27 PROCEDURE — 87205 SMEAR GRAM STAIN: CPT

## 2022-06-27 PROCEDURE — 87070 CULTURE OTHR SPECIMN AEROBIC: CPT

## 2022-06-27 PROCEDURE — 85730 THROMBOPLASTIN TIME PARTIAL: CPT

## 2022-06-27 PROCEDURE — 36415 COLL VENOUS BLD VENIPUNCTURE: CPT

## 2022-06-27 PROCEDURE — 80329 ANALGESICS NON-OPIOID 1 OR 2: CPT

## 2022-06-27 PROCEDURE — 82150 ASSAY OF AMYLASE: CPT

## 2022-06-27 PROCEDURE — 83690 ASSAY OF LIPASE: CPT

## 2022-06-27 PROCEDURE — 83880 ASSAY OF NATRIURETIC PEPTIDE: CPT

## 2022-06-27 PROCEDURE — 86141 C-REACTIVE PROTEIN HS: CPT

## 2022-06-27 PROCEDURE — 87636 SARSCOV2 & INF A&B AMP PRB: CPT

## 2022-06-27 PROCEDURE — 80053 COMPREHEN METABOLIC PANEL: CPT

## 2022-06-27 PROCEDURE — 93005 ELECTROCARDIOGRAM TRACING: CPT

## 2022-06-27 PROCEDURE — 71045 X-RAY EXAM CHEST 1 VIEW: CPT

## 2022-06-27 NOTE — ED GENERAL
General


Chief Complaint:  Respiratory Problems


Stated Complaint:  CHEST PAIN/IRR HEART RATE


Nursing Triage Note:  


sob since yesterday, feels heart is beating out of your chest no pain in chest, 


states diarrhea since saturday. denies cough or fever.


Source of Information:  Patient





History of Present Illness


Date Seen by Provider:  Jun 27, 2022


Time Seen by Provider:  10:55


Initial Comments


PT ARRIVES VIA POV--SENT FROM [facility]. NO CALL





Allergies and Home Medications


Allergies


Coded Allergies:  


     No Known Drug Allergies (Unverified , 5/19/17)





Patient Home Medication List


Azithromycin (Zithromax) 500 Mg Tablet, 500 MG PO DAILY


   Prescribed by: TUSHAR DENNY on 6/27/22 1144


Benzonatate (Tessalon Perles) 100 Mg Capsule, 200 MG PO TID


   Prescribed by: TUSHAR DENNY on 6/27/22 1144


Cefdinir (Cefdinir) 300 Mg Capsule, 300 MG PO BID


   Prescribed by: TUSHAR DENNY on 6/27/22 1144


Cyclobenzaprine HCl (Cyclobenzaprine HCl) 5 Mg Tablet, 5 MG PO TID PRN for PAIN-

MODERATE


   Prescribed by: MARGE GARCIA on 5/19/17 1754


Guaifenesin/Dextromethorphan (Mucinex Dm ER 1,200-60 mg Tab) 1,200 Mg-60 Mg 

Tbmp.12hr, 1 EACH PO BID


   Prescribed by: TUSHAR DENNY on 6/27/22 1144


Hydrocodone Bit/Acetaminophen (HYDROcodone/APAP 7.5/325 TAB) 1 Each Tablet, 1 

EACH PO Q4H PRN for pain


   Prescribed by: BHARTI HERNANDEZ on 4/9/18 1406


Ondansetron (Ondansetron Odt) 8 Mg Tab.rapdis, 8 MG PO Q6H


   Prescribed by: TUSHAR DENNY on 6/27/22 1144





Past Medical-Social-Family Hx


Patient Social History


Tobacco Use?:  Yes


Tobacco type used:  Cigarettes


Smoking Status:  Current Everyday Smoker


Use of E-Cig and/or Vaping dev:  No


Substance use?:  Yes


Substance type:  Marijuana


Alcohol Use?:  No


Pt feels they are or have been:  No





Immunizations Up To Date


First/Initial COVID19 Vaccinat:  March 21


Second COVID19 Vaccination Chai:  APRIL 21


COVID19 Vaccine :  MODERNA





Past Medical History


Surgery/Hospitalization HX:  


none


Surgeries:  No


Respiratory:  No


Cardiac:  No


Neurological:  No


Genitourinary:  No


Gastrointestinal:  No


Musculoskeletal:  No


Endocrine:  No


HEENT:  No


Cancer:  No


Psychosocial:  No


Integumentary:  No


Blood Disorders:  No





Family Medical History


No Pertinent Family Hx





Physical Exam


Vital Signs





Vital Signs - First Documented








 6/27/22





 10:53


 


Temp 36.1


 


Pulse 68


 


Resp 18


 


B/P (MAP) 149/90 (109)


 


Pulse Ox 96





Capillary Refill : Less Than 3 Seconds


Height, Weight, BMI


Height: 5'11.00"


Weight: 160lbs. oz. 72.986964jt; 23.00 BMI


Method:Stated





Progress/Results/Core Measures


Suspected Sepsis


SIRS


Temperature: 


Pulse: 68 


Respiratory Rate: 18


 


Laboratory Tests


6/27/22 11:02: White Blood Count 10.0


Blood Pressure 149 /90 


Mean: 109


 


Laboratory Tests


6/27/22 11:02: 


Creatinine 1.11, INR Comment 1.0, Platelet Count 241, Total Bilirubin 0.7








Results/Orders


Lab Results





Laboratory Tests








Test


 6/27/22


11:02 6/27/22


11:12 Range/Units


 


 


White Blood Count


 10.0 


 


 4.3-11.0


10^3/uL


 


Red Blood Count


 5.41 


 


 4.30-5.52


10^6/uL


 


Hemoglobin 16.6   13.3-17.7  g/dL


 


Hematocrit 49   40-54  %


 


Mean Corpuscular Volume 90   80-99  fL


 


Mean Corpuscular Hemoglobin 31   25-34  pg


 


Mean Corpuscular Hemoglobin


Concent 34 


 


 32-36  g/dL





 


Red Cell Distribution Width 12.6   10.0-14.5  %


 


Platelet Count


 241 


 


 130-400


10^3/uL


 


Mean Platelet Volume 9.9   9.0-12.2  fL


 


Immature Granulocyte % (Auto) 0    %


 


Neutrophils (%) (Auto) 72   42-75  %


 


Lymphocytes (%) (Auto) 20   12-44  %


 


Monocytes (%) (Auto) 6   0-12  %


 


Eosinophils (%) (Auto) 1   0-10  %


 


Basophils (%) (Auto) 1   0-10  %


 


Neutrophils # (Auto)


 7.3 


 


 1.8-7.8


10^3/uL


 


Lymphocytes # (Auto)


 2.0 


 


 1.0-4.0


10^3/uL


 


Monocytes # (Auto)


 0.6 


 


 0.0-1.0


10^3/uL


 


Eosinophils # (Auto)


 0.1 


 


 0.0-0.3


10^3/uL


 


Basophils # (Auto)


 0.1 


 


 0.0-0.1


10^3/uL


 


Immature Granulocyte # (Auto)


 0.0 


 


 0.0-0.1


10^3/uL


 


Erythrocyte Sedimentation Rate 1   0-15  MM/HR


 


Prothrombin Time 13.2   12.2-14.7  SEC


 


INR Comment 1.0   0.8-1.4  


 


Activated Partial


Thromboplast Time 30 


 


 24-35  SEC





 


D-Dimer


 < 0.27 


 


 0.00-0.49


UG/ML


 


Sodium Level 140   135-145  MMOL/L


 


Potassium Level 3.8   3.6-5.0  MMOL/L


 


Chloride Level 106     MMOL/L


 


Carbon Dioxide Level 24   21-32  MMOL/L


 


Anion Gap 10   5-14  MMOL/L


 


Blood Urea Nitrogen 10   7-18  MG/DL


 


Creatinine


 1.11 


 


 0.60-1.30


MG/DL


 


Estimat Glomerular Filtration


Rate 88 


 


  





 


BUN/Creatinine Ratio 9    


 


Glucose Level 95     MG/DL


 


Calcium Level 9.5   8.5-10.1  MG/DL


 


Corrected Calcium    8.5-10.1  MG/DL


 


Magnesium Level 2.2   1.6-2.4  MG/DL


 


Total Bilirubin 0.7   0.1-1.0  MG/DL


 


Aspartate Amino Transf


(AST/SGOT) 16 


 


 5-34  U/L





 


Alanine Aminotransferase


(ALT/SGPT) 17 


 


 0-55  U/L





 


Alkaline Phosphatase 64     U/L


 


Total Creatine Kinase 125     U/L


 


Creatine Kinase MB 1.0   <6.6  NG/ML


 


Myoglobin


 32.6 


 


 10.0-92.0


NG/ML


 


Troponin I < 0.028   <0.028  NG/ML


 


C-Reactive Protein High


Sensitivity 0.02 


 


 0.00-0.50


MG/DL


 


B-Type Natriuretic Peptide < 10.0   <100.0  PG/ML


 


Total Protein 7.4   6.4-8.2  GM/DL


 


Albumin 4.6 H  3.2-4.5  GM/DL


 


Amylase Level 63     U/L


 


Lipase 14   8-78  U/L


 


TSH Tryon Testing


 0.53 


 


 0.35-4.94


UIU/ML


 


Acetaminophen Level < 10 L  10-30  UG/ML


 


Influenza Type A (RT-PCR) Not Detected   Not Detecte  


 


Influenza Type B (RT-PCR) Not Detected   Not Detecte  


 


SARS-CoV-2 RNA (RT-PCR) Not Detected   Not Detecte  


 


Urine Color  YELLOW   


 


Urine Clarity  CLEAR   


 


Urine pH  8.5  5-9  


 


Urine Specific Gravity  1.020  1.016-1.022  


 


Urine Protein  NEGATIVE  NEGATIVE  


 


Urine Glucose (UA)  NEGATIVE  NEGATIVE  


 


Urine Ketones  NEGATIVE  NEGATIVE  


 


Urine Nitrite  NEGATIVE  NEGATIVE  


 


Urine Bilirubin  NEGATIVE  NEGATIVE  


 


Urine Urobilinogen  0.2  < = 1.0  MG/DL


 


Urine Leukocyte Esterase  NEGATIVE  NEGATIVE  


 


Urine RBC (Auto)  NEGATIVE  NEGATIVE  


 


Urine RBC  NONE   /HPF


 


Urine WBC  NONE   /HPF


 


Urine Squamous Epithelial


Cells 


 NONE 


  /HPF





 


Urine Crystals  NONE   /LPF


 


Urine Bacteria  NEGATIVE   /HPF


 


Urine Casts  NONE   /LPF


 


Urine Mucus  NEGATIVE   /LPF


 


Urine Culture Indicated  NO   


 


Urine Opiates Screen  NEGATIVE  NEGATIVE  


 


Urine Oxycodone Screen  NEGATIVE  NEGATIVE  


 


Urine Methadone Screen  NEGATIVE  NEGATIVE  


 


Urine Propoxyphene Screen  NEGATIVE  NEGATIVE  


 


Urine Barbiturates Screen  NEGATIVE  NEGATIVE  


 


Ur Tricyclic Antidepressants


Screen 


 NEGATIVE 


 NEGATIVE  





 


Urine Phencyclidine Screen  NEGATIVE  NEGATIVE  


 


Urine Amphetamines Screen  NEGATIVE  NEGATIVE  


 


Urine Methamphetamines Screen  NEGATIVE  NEGATIVE  


 


Urine Benzodiazepines Screen  NEGATIVE  NEGATIVE  


 


Urine Cocaine Screen  NEGATIVE  NEGATIVE  


 


Urine Cannabinoids Screen  POSITIVE H NEGATIVE  








My Orders





Orders - TUSHAR DENNY DO


Ed Iv/Invasive Line Start (6/27/22 10:59)


Ekg Tracing (6/27/22 10:59)


Monitor-Rhythm Ecg Trace Only (6/27/22 10:59)


Chest 1 View, Ap/Pa Only (6/27/22 10:59)


Acetaminophen (6/27/22 10:59)


Amylase (6/27/22 10:59)


Bnp [place] (6/27/22 10:59)


Cbc With Automated Diff (6/27/22 10:59)


Comprehensive Metabolic Panel (6/27/22 10:59)


Creatine Kinase (6/27/22 10:59)


Creatine Kinase Mb (6/27/22 10:59)


Hs C Reactive Protein (6/27/22 10:59)


Fibrin Degradation Products (6/27/22 10:59)


Drug Screen Stat (Urine) (6/27/22 10:59)


Lipase (6/27/22 10:59)


Magnesium (6/27/22 10:59)


Procalcitonin (Pct) (6/27/22 10:59)


Protime With Inr (6/27/22 10:59)


Partial Thromboplastin Time (6/27/22 10:59)


Thyroid Analyzer (6/27/22 10:59)


Ua Culture If Indicated (6/27/22 10:59)


Erythrocyte Sedimentation Rate (6/27/22 10:59)


Myoglobin Serum (6/27/22 10:59)


Troponin I [place] (6/27/22 10:59)


Covid 19 Inhouse Test (6/27/22 10:59)


Ed Iv/Invasive Line Start (6/27/22 10:59)


Aspirin Chewable Tablet (Baby Aspirin Ch (6/27/22 11:00)


Influenza A And B By Pcr (6/27/22 10:59)


Isolation Central Supply Req (6/27/22 10:59)


Sputum Culture (6/27/22 11:15)


Ceftriaxone 1 Gm Pre-Mix (Rocephin 1 Gm (6/27/22 11:45)


Azithromycin Injection (Zithromax Inject (6/27/22 11:45)





Medications Given in ED





Current Medications








 Medications  Dose


 Ordered  Sig/Nayla


 Route  Start Time


 Stop Time Status Last Admin


Dose Admin


 


 Aspirin  324 mg  ONCE  ONCE


 PO  6/27/22 11:00


 6/27/22 11:02 DC 6/27/22 11:10


324 MG








Vital Signs/I&O











 6/27/22





 10:53


 


Temp 36.1


 


Pulse 68


 


Resp 18


 


B/P (MAP) 149/90 (109)


 


Pulse Ox 96





Capillary Refill : Less Than 3 Seconds








Blood Pressure Mean:                    109











Departure


Impression





   Primary Impression:  


   RIGHT SIDED PNEUMONIA


Disposition:  01 HOME, SELF-CARE


Condition:  Stable





Departure-Patient Inst.


Decision time for Depature:  12:05


Referrals:  


[facility]


Patient Instructions:  Pneumonia, Adult (DC)





Add. Discharge Instructions:  


LOTS OF CLEAR LIQUIDS





TYLENOL AND MOTRIN AS NEEDED FOR PAIN OR FEVER





FOLLOW UP WITH [facility] IN 3-4 DAYS FOR FURTHER CARE, RETURN TO ER IF WORSE





All discharge instructions reviewed with patient and/or family. Voiced 

understanding.


Scripts


Benzonatate (TESSALON PERLES) 100 Mg Capsule


200 MG PO TID, #30 CAP


   Prov: KARL DENNYA K DO         6/27/22 


Ondansetron (Ondansetron Odt) 8 Mg Tab.rapdis


8 MG PO Q6H, #10 TAB


   Prov: TUSHAR DENNY DO         6/27/22 


Guaifenesin/Dextromethorphan (Mucinex Dm ER 1,200-60 mg Tab) 1,200 Mg-60 Mg 

Tbmp.12hr


1 EACH PO BID, #20 EA


   Prov: TUSHAR DENNY DO         6/27/22 


Azithromycin (Zithromax) 500 Mg Tablet


500 MG PO DAILY for 5 Days, #5 TAB


   Prov: TUSHAR DENNY DO         6/27/22 


Cefdinir (Cefdinir) 300 Mg Capsule


300 MG PO BID, #20 CAP


   Prov: TUSHAR DENNY DO         6/27/22


Work/School Note:  Work Release Form   Date Seen in the Emergency Department:  

Jun 27, 2022


      Restrictions:  Need Release from Doctor











TUSHAR DENNY DO                 Jun 27, 2022 11:41

## 2022-06-27 NOTE — DIAGNOSTIC IMAGING REPORT
INDICATION: Chest pain.



FINDINGS: The heart size is normal. There is some atelectasis

and/or pneumonitis in the medial aspect of right lung base. There

is no pleural effusion or pneumothorax. Mediastinum is

unremarkable.



IMPRESSION: Atelectasis and/or pneumonitis in the medial aspect

right lung base otherwise unremarkable.



Dictated by: 



  Dictated on workstation # [id number]

## 2022-10-28 ENCOUNTER — HOSPITAL ENCOUNTER (EMERGENCY)
Dept: HOSPITAL 75 - ER | Age: 37
Discharge: HOME | End: 2022-10-28
Payer: SELF-PAY

## 2022-10-28 VITALS — SYSTOLIC BLOOD PRESSURE: 137 MMHG | DIASTOLIC BLOOD PRESSURE: 74 MMHG

## 2022-10-28 DIAGNOSIS — F17.210: ICD-10-CM

## 2022-10-28 DIAGNOSIS — K27.9: Primary | ICD-10-CM

## 2022-10-28 LAB
ALBUMIN SERPL-MCNC: 4.6 GM/DL (ref 3.2–4.5)
ALP SERPL-CCNC: 59 U/L (ref 40–136)
ALT SERPL-CCNC: 15 U/L (ref 0–55)
BASOPHILS # BLD AUTO: 0.1 10^3/UL (ref 0–0.1)
BASOPHILS NFR BLD AUTO: 1 % (ref 0–10)
BILIRUB SERPL-MCNC: 0.6 MG/DL (ref 0.1–1)
BUN/CREAT SERPL: 10
CALCIUM SERPL-MCNC: 9.7 MG/DL (ref 8.5–10.1)
CHLORIDE SERPL-SCNC: 105 MMOL/L (ref 98–107)
CO2 SERPL-SCNC: 25 MMOL/L (ref 21–32)
CREAT SERPL-MCNC: 0.98 MG/DL (ref 0.6–1.3)
EOSINOPHIL # BLD AUTO: 0.4 10^3/UL (ref 0–0.3)
EOSINOPHIL NFR BLD AUTO: 5 % (ref 0–10)
GFR SERPLBLD BASED ON 1.73 SQ M-ARVRAT: 102 ML/MIN
GLUCOSE SERPL-MCNC: 113 MG/DL (ref 70–105)
HCT VFR BLD CALC: 49 % (ref 40–54)
HGB BLD-MCNC: 17 G/DL (ref 13.3–17.7)
LIPASE SERPL-CCNC: 30 U/L (ref 8–78)
LYMPHOCYTES # BLD AUTO: 2 10^3/UL (ref 1–4)
LYMPHOCYTES NFR BLD AUTO: 26 % (ref 12–44)
MANUAL DIFFERENTIAL PERFORMED BLD QL: NO
MCH RBC QN AUTO: 31 PG (ref 25–34)
MCHC RBC AUTO-ENTMCNC: 35 G/DL (ref 32–36)
MCV RBC AUTO: 88 FL (ref 80–99)
MONOCYTES # BLD AUTO: 0.6 10^3/UL (ref 0–1)
MONOCYTES NFR BLD AUTO: 8 % (ref 0–12)
NEUTROPHILS # BLD AUTO: 4.5 10^3/UL (ref 1.8–7.8)
NEUTROPHILS NFR BLD AUTO: 60 % (ref 42–75)
PLATELET # BLD: 242 10^3/UL (ref 130–400)
PMV BLD AUTO: 10.2 FL (ref 9–12.2)
POTASSIUM SERPL-SCNC: 3.5 MMOL/L (ref 3.6–5)
PROT SERPL-MCNC: 7.4 GM/DL (ref 6.4–8.2)
SODIUM SERPL-SCNC: 139 MMOL/L (ref 135–145)
WBC # BLD AUTO: 7.6 10^3/UL (ref 4.3–11)

## 2022-10-28 PROCEDURE — 36415 COLL VENOUS BLD VENIPUNCTURE: CPT

## 2022-10-28 PROCEDURE — 83690 ASSAY OF LIPASE: CPT

## 2022-10-28 PROCEDURE — 80053 COMPREHEN METABOLIC PANEL: CPT

## 2022-10-28 PROCEDURE — 85025 COMPLETE CBC W/AUTO DIFF WBC: CPT

## 2022-10-28 PROCEDURE — 76705 ECHO EXAM OF ABDOMEN: CPT

## 2022-10-28 PROCEDURE — 86141 C-REACTIVE PROTEIN HS: CPT

## 2022-10-28 NOTE — ED ABDOMINAL PAIN
General


Chief Complaint:  Abdominal/GI Problems


Stated Complaint:  ABD PAIN


Nursing Triage Note:  


PT AMB TO [facility] WITH C/O R SIDE ABD PAIN FOR A FEW DAYS THAT COMES AND GOES. PT 


STATES HE HAS HAD DIARHEA THIS WEEK BUT IT STARTING TO SUBSIDE TODAY


Source of Information:  Patient


Exam Limitations:  No Limitations





History of Present Illness


Date Seen by Provider:  Oct 28, 2022


Time Seen by Provider:  09:15


Initial Comments


Here with report of right upper quadrant pain over the last few days that is 

intermittent but worse since last night.  Has had occasional diarrhea.  States 

last night was worse after eating chili.  Does admit to eating jalapenos like 

candy.  Does have history of panic disorder and states that he did look online 

and noted that the liver and gallbladder were there and he was worried about 

that.  No vomiting or fever.  Otherwise doing better now.


Timing/Duration:  3-4 Days, Intermittent


Severity/Quality:  Moderate, Aching, Sharp


Location:  RUQ


Radiation:  No Radiation


Activities at Onset:  None


Modifying Factors:  Worsens With Eating


Associated Symptoms:  No Back Pain, No Chest Pain, No Fever/Chills, No 

Nausea/Vomiting, No Shortness of Air, No Swelling/Mass in Abdomen, No Weakness





Allergies and Home Medications


Allergies


Coded Allergies:  


     No Known Drug Allergies (Unverified , 5/19/17)





Patient Home Medication List


Home Medication List Reviewed:  Yes


Azithromycin (Zithromax) 500 Mg Tablet, 500 MG PO DAILY


   Prescribed by: TUSHAR DENNY on 6/27/22 1144


Benzonatate (Tessalon Perles) 100 Mg Capsule, 200 MG PO TID


   Prescribed by: TUSHAR DENNY on 6/27/22 1144


Cefdinir (Cefdinir) 300 Mg Capsule, 300 MG PO BID


   Prescribed by: TUSHAR DENNY on 6/27/22 1144


Cyclobenzaprine HCl (Cyclobenzaprine HCl) 5 Mg Tablet, 5 MG PO TID PRN for PAIN-

MODERATE


   Prescribed by: MARGE GARCIA on 5/19/17 1754


Guaifenesin/Dextromethorphan (Mucinex Dm ER 1,200-60 mg Tab) 1,200 Mg-60 Mg 

Tbmp.12hr, 1 EACH PO BID


   Prescribed by: TUSHAR DENNY on 6/27/22 1144


Hydrocodone Bit/Acetaminophen (HYDROcodone/APAP 7.5/325 TAB) 1 Each Tablet, 1 

EACH PO Q4H PRN for pain


   Prescribed by: BHARTI HERNANDEZ on 4/9/18 1406


Ondansetron (Ondansetron Odt) 8 Mg Tab.rapdis, 8 MG PO Q6H


   Prescribed by: TUSHAR DENNY on 6/27/22 1144





Review of Systems


Review of Systems


Constitutional:  see HPI; No chills, No fever


EENTM:  No Symptoms Reported


Respiratory:  Denies Cough, Denies Shortness of Air


Cardiovascular:  Denies Chest Pain, Denies Edema


Gastrointestinal:  See HPI


Genitourinary:  No Symptoms Reported


Musculoskeletal:  No back pain, No muscle pain


Psychiatric/Neurological:  Anxiety; Denies Headache





All Other Systems Reviewed


Negative Unless Noted:  Yes





Past Medical-Social-Family Hx


Patient Social History


Tobacco Use?:  Yes


Tobacco type used:  Cigarettes


Smoking Status:  Current Everyday Smoker


Substance use?:  Yes


Substance type:  Marijuana


Alcohol Use?:  No


Pt feels they are or have been:  No





Immunizations Up To Date


Influenza Vaccine Up-to-Date:  No; Not Current


First/Initial COVID19 Vaccinat:  March 21


Second COVID19 Vaccination Chai:  APRIL 21


COVID19 Vaccine :  MODERNA





Past Medical History


Surgery/Hospitalization HX:  


none


Surgeries:  No


Respiratory:  No


Cardiac:  No


Neurological:  No


Genitourinary:  No


Gastrointestinal:  No


Musculoskeletal:  No


Endocrine:  No


HEENT:  No


Cancer:  No


Psychosocial:  Yes (POLYSUBSTANCE ABUSE)


Integumentary:  No


Blood Disorders:  No





Family Medical History


Reviewed Nursing Family Hx


No Pertinent Family Hx








SOCIAL HISTORY:


-SMOKES 1/2- 1 PPD


-DRUGS, + IV HEROIN USE, DAILY THC USE


-DENIES ALCOHOL USE





Physical Exam


Vital Signs





Vital Signs - First Documented








 10/28/22





 08:34


 


Temp 36.5


 


Pulse 61


 


Resp 16


 


B/P (MAP) 140/81 (100)





Capillary Refill :


Height/Weight/BMI


Height: 5'11.00"


Weight: 160lbs. oz. 72.892019sz; 23.00 BMI


Method:Stated


General Appearance:  WD/WN, no apparent distress


HEENT:  PERRL/EOMI, pharynx normal


Neck:  full range of motion, supple


Respiratory:  lungs clear, normal breath sounds


Cardiovascular:  regular rate, rhythm, no murmur


Gastrointestinal:  soft; No guarding, No rebound; tenderness (Very mild right 

upper quadrant lateral aspect)


Extremities:  non-tender, normal inspection


Back:  normal inspection, no CVA tenderness, no vertebral tenderness


Neurologic/Psychiatric:  alert, oriented x 3


Skin:  normal color, warm/dry





Progress/Results/Core Measures


Results/Orders


Lab Results





Laboratory Tests








Test


 10/28/22


08:40 Range/Units


 


 


White Blood Count


 7.6 


 4.3-11.0


10^3/uL


 


Red Blood Count


 5.54 H


 4.30-5.52


10^6/uL


 


Hemoglobin 17.0  13.3-17.7  g/dL


 


Hematocrit 49  40-54  %


 


Mean Corpuscular Volume 88  80-99  fL


 


Mean Corpuscular Hemoglobin 31  25-34  pg


 


Mean Corpuscular Hemoglobin


Concent 35 


 32-36  g/dL





 


Red Cell Distribution Width 12.3  10.0-14.5  %


 


Platelet Count


 242 


 130-400


10^3/uL


 


Mean Platelet Volume 10.2  9.0-12.2  fL


 


Immature Granulocyte % (Auto) 1   %


 


Neutrophils (%) (Auto) 60  42-75  %


 


Lymphocytes (%) (Auto) 26  12-44  %


 


Monocytes (%) (Auto) 8  0-12  %


 


Eosinophils (%) (Auto) 5  0-10  %


 


Basophils (%) (Auto) 1  0-10  %


 


Neutrophils # (Auto)


 4.5 


 1.8-7.8


10^3/uL


 


Lymphocytes # (Auto)


 2.0 


 1.0-4.0


10^3/uL


 


Monocytes # (Auto)


 0.6 


 0.0-1.0


10^3/uL


 


Eosinophils # (Auto)


 0.4 H


 0.0-0.3


10^3/uL


 


Basophils # (Auto)


 0.1 


 0.0-0.1


10^3/uL


 


Immature Granulocyte # (Auto)


 0.0 


 0.0-0.1


10^3/uL


 


Sodium Level 139  135-145  MMOL/L


 


Potassium Level 3.5 L 3.6-5.0  MMOL/L


 


Chloride Level 105    MMOL/L


 


Carbon Dioxide Level 25  21-32  MMOL/L


 


Anion Gap 9  5-14  MMOL/L


 


Blood Urea Nitrogen 10  7-18  MG/DL


 


Creatinine


 0.98 


 0.60-1.30


MG/DL


 


Estimat Glomerular Filtration


Rate 102 


  





 


BUN/Creatinine Ratio 10   


 


Glucose Level 113 H   MG/DL


 


Calcium Level 9.7  8.5-10.1  MG/DL


 


Corrected Calcium   8.5-10.1  MG/DL


 


Total Bilirubin 0.6  0.1-1.0  MG/DL


 


Aspartate Amino Transf


(AST/SGOT) 15 


 5-34  U/L





 


Alanine Aminotransferase


(ALT/SGPT) 15 


 0-55  U/L





 


Alkaline Phosphatase 59    U/L


 


C-Reactive Protein High


Sensitivity 0.05 


 0.00-0.50


MG/DL


 


Total Protein 7.4  6.4-8.2  GM/DL


 


Albumin 4.6 H 3.2-4.5  GM/DL


 


Lipase 30  8-78  U/L








My Orders





Orders - JENIFFER ARORA MD


Cbc With Automated Diff (10/28/22 09:26)


Comprehensive Metabolic Panel (10/28/22 09:26)


Hs C Reactive Protein (10/28/22 09:26)


Lipase (10/28/22 09:26)


Us Gallbladder [id number] (10/28/22 09:26)


Ed Iv/Invasive Line Start (10/28/22 09:26)





Vital Signs/I&O











 10/28/22





 08:34


 


Temp 36.5


 


Pulse 61


 


Resp 16


 


B/P (MAP) 140/81 (100)














Blood Pressure Mean:                    100











Progress


Progress Note :  


Progress Note


Seen and evaluated.  Given presenting complaint, we will go ahead and check labs

including liver enzymes and lipase.  We will also get gallbladder ultrasound.  

This may be related to peptic ulcer disease as he does partake in lots of spicy 

foods.  He also has anxiety disorder.  We will see what labs and ultrasound show

and move forward from there.  This was discussed with the patient who agrees.  

Monitor patient.  1023: No indication of bacterial infection with white count 

normal LFTs normal and normal total bilirubin.  Preliminary read on ultrasound 

shows no acute findings of the gallbladder.  I did discuss all this with the 

patient.  He is very reassured.  I do believe this is more related to peptic 

ulcer type disorder given his proclivity for spicy foods.  He also admits to 

being under a lot of stress as he has a new baby coming in a few days.  This may

be adding to that he admits that as well.  We did discuss outpatient therapy and

follow-up including follow-up with [facility] and I will give him the name

of the surgeon on-call today for follow-up as well as needed.  Discharged home 

with return precautions.  Patient verbalized understanding of instructions and 

agreement with plan.





Diagnostic Imaging





   Diagonstic Imaging:  Ultrasound


   Plain Films/CT/US/NM/MRI:  abdomen


Comments


No gallbladder wall thickening or stones noted.  No pericholecystic fluid.  

Preliminary read.  Pending final.





Departure


Impression





   Primary Impression:  


   Right upper quadrant pain


   Additional Impression:  


   Peptic ulcer disease


Disposition:  01 HOME, SELF-CARE


Condition:  Improved





Departure-Patient Inst.


Decision time for Depature:  10:25


Referrals:  


NO,LOCAL PHYSICIAN (PCP/Family)


Primary Care Physician


Patient Instructions:  Severe Abdominal Pain, Adult (DC), Peptic Ulcers





Add. Discharge Instructions:  








All discharge instructions reviewed with patient and/or family. Voiced 

understanding.





You should avoid spicy foods.  You may take over-the-counter omeprazole 20 mg 

daily and you may take that for up to 6 weeks.  Seek follow-up through community

health system and/or physician listed.  You may need further evaluation 

including upper endoscopy (scope) if this continues.  Return for worse pain, 

vomiting, weakness, blood in your vomit or stool, fever or other concerns as 

needed.











JENIFFER ARORA MD          Oct 28, 2022 09:53

## 2022-10-28 NOTE — DIAGNOSTIC IMAGING REPORT
PROCEDURE: US Gallbladder.



INDICATION:  Right upper quadrant abdominal pain



TECHNIQUE: Multiple grayscale sonographic images were obtained of

the right upper quadrant of the abdomen. 



CORRELATION STUDY:  None



FINDINGS: 

LIVER:  There is uniform echotexture within the visualized

portions of the liver.  The main portal vein is patent and with

normal direction of flow.  Liver length 14.5 cm.



GALLBLADDER:  The gallbladder demonstrates no definitive

shadowing gallstones, abnormal gallbladder wall thickening or

pericholecystic fluid. 

COMMON BILE DUCT:  Nondilated at 0.4 cm.  



AORTA/IVC:  Not well visualized.

PANCREAS:  Visualized portions appearing unremarkable.

RIGHT KIDNEY:  11.5 x 4.4 x 5.8 cm.  No hydronephrosis. 



OTHER:   None.





IMPRESSION: 

1. Negative appearing right upper quadrant abdominal ultrasound.



Dictated by: 



  Dictated on workstation # [id number]

## 2022-11-02 ENCOUNTER — HOSPITAL ENCOUNTER (EMERGENCY)
Dept: HOSPITAL 75 - ER | Age: 37
Discharge: HOME | End: 2022-11-02
Payer: SELF-PAY

## 2022-11-02 VITALS — WEIGHT: 162.48 LBS | BODY MASS INDEX: 23.52 KG/M2 | HEIGHT: 69.69 IN

## 2022-11-02 VITALS — SYSTOLIC BLOOD PRESSURE: 124 MMHG | DIASTOLIC BLOOD PRESSURE: 79 MMHG

## 2022-11-02 DIAGNOSIS — R10.13: Primary | ICD-10-CM

## 2022-11-02 DIAGNOSIS — F17.200: ICD-10-CM

## 2022-11-02 LAB
ALBUMIN SERPL-MCNC: 4.9 GM/DL (ref 3.2–4.5)
ALP SERPL-CCNC: 61 U/L (ref 40–136)
ALT SERPL-CCNC: 17 U/L (ref 0–55)
AMORPH SED URNS QL MICRO: (no result) /LPF
APTT PPP: YELLOW S
BACTERIA #/AREA URNS HPF: NEGATIVE /HPF
BARBITURATES UR QL: NEGATIVE
BASOPHILS # BLD AUTO: 0.1 10^3/UL (ref 0–0.1)
BASOPHILS NFR BLD AUTO: 1 % (ref 0–10)
BENZODIAZ UR QL SCN: NEGATIVE
BILIRUB SERPL-MCNC: 0.6 MG/DL (ref 0.1–1)
BILIRUB UR QL STRIP: NEGATIVE
BUN/CREAT SERPL: 9
CALCIUM SERPL-MCNC: 11 MG/DL (ref 8.5–10.1)
CHLORIDE SERPL-SCNC: 104 MMOL/L (ref 98–107)
CO2 SERPL-SCNC: 30 MMOL/L (ref 21–32)
COCAINE UR QL: NEGATIVE
CREAT SERPL-MCNC: 1 MG/DL (ref 0.6–1.3)
EOSINOPHIL # BLD AUTO: 0 10^3/UL (ref 0–0.3)
EOSINOPHIL NFR BLD AUTO: 0 % (ref 0–10)
FIBRINOGEN PPP-MCNC: (no result) MG/DL
GFR SERPLBLD BASED ON 1.73 SQ M-ARVRAT: 100 ML/MIN
GLUCOSE SERPL-MCNC: 109 MG/DL (ref 70–105)
GLUCOSE UR STRIP-MCNC: NEGATIVE MG/DL
HCT VFR BLD CALC: 47 % (ref 40–54)
HGB BLD-MCNC: 16.7 G/DL (ref 13.3–17.7)
KETONES UR QL STRIP: NEGATIVE
LEUKOCYTE ESTERASE UR QL STRIP: NEGATIVE
LIPASE SERPL-CCNC: 12 U/L (ref 8–78)
LYMPHOCYTES # BLD AUTO: 1.5 10^3/UL (ref 1–4)
LYMPHOCYTES NFR BLD AUTO: 17 % (ref 12–44)
MANUAL DIFFERENTIAL PERFORMED BLD QL: NO
MCH RBC QN AUTO: 31 PG (ref 25–34)
MCHC RBC AUTO-ENTMCNC: 36 G/DL (ref 32–36)
MCV RBC AUTO: 87 FL (ref 80–99)
METHADONE UR QL SCN: NEGATIVE
MONOCYTES # BLD AUTO: 0.6 10^3/UL (ref 0–1)
MONOCYTES NFR BLD AUTO: 7 % (ref 0–12)
NEUTROPHILS # BLD AUTO: 6.6 10^3/UL (ref 1.8–7.8)
NEUTROPHILS NFR BLD AUTO: 75 % (ref 42–75)
NITRITE UR QL STRIP: NEGATIVE
OPIATES UR QL SCN: POSITIVE
OXYCODONE UR QL: NEGATIVE
PH UR STRIP: 7.5 [PH] (ref 5–9)
PLATELET # BLD: 239 10^3/UL (ref 130–400)
PMV BLD AUTO: 10 FL (ref 9–12.2)
POTASSIUM SERPL-SCNC: 3.7 MMOL/L (ref 3.6–5)
PROPOXYPH UR QL: NEGATIVE
PROT SERPL-MCNC: 7.7 GM/DL (ref 6.4–8.2)
PROT UR QL STRIP: NEGATIVE
RBC #/AREA URNS HPF: (no result) /HPF
SODIUM SERPL-SCNC: 142 MMOL/L (ref 135–145)
SP GR UR STRIP: 1.01 (ref 1.02–1.02)
SQUAMOUS #/AREA URNS HPF: (no result) /HPF
TRICYCLICS UR QL SCN: NEGATIVE
WBC # BLD AUTO: 8.8 10^3/UL (ref 4.3–11)
WBC #/AREA URNS HPF: (no result) /HPF

## 2022-11-02 PROCEDURE — 74177 CT ABD & PELVIS W/CONTRAST: CPT

## 2022-11-02 PROCEDURE — 81000 URINALYSIS NONAUTO W/SCOPE: CPT

## 2022-11-02 PROCEDURE — 80053 COMPREHEN METABOLIC PANEL: CPT

## 2022-11-02 PROCEDURE — 36415 COLL VENOUS BLD VENIPUNCTURE: CPT

## 2022-11-02 PROCEDURE — 80306 DRUG TEST PRSMV INSTRMNT: CPT

## 2022-11-02 PROCEDURE — 83690 ASSAY OF LIPASE: CPT

## 2022-11-02 PROCEDURE — 85025 COMPLETE CBC W/AUTO DIFF WBC: CPT

## 2022-11-02 NOTE — ED ABDOMINAL PAIN
General


Chief Complaint:  Abdominal/GI Problems


Stated Complaint:  STOMACH PAIN


Nursing Triage Note:  


WAS SEEN HERE A COUPLE OF DAYS AGO FOR SAME SX BUT STATES HIS STOMACH PAIN IS 


WORSE.  PT STATES HE HAD A NEW BABY BORN TODAY AND 5 KIDS AT HOME SO IT HAS BEEN




STRESSFUL.





History of Present Illness


Date Seen by Provider:  2022


Time Seen by Provider:  16:44


Initial Comments


Patient reports that he was here a few days ago for the same complaint but 

continues to have abdominal pain. Reports that he has been under a lot of stress

recently. Had a baby born today and has 5 other children at home. Denies nausea,

vomiting, fever or diarrhea. Is concerned that he has a tumor or something 

causing the problems. Does admit to having anxiety.


Timing/Duration:  Getting Worse


Severity/Quality:  Moderate


Location:  Epigastric


Activities at Onset:  None


Associated Symptoms:  No Fever/Chills, No Nausea/Vomiting





Allergies and Home Medications


Allergies


Coded Allergies:  


     No Known Drug Allergies (Unverified , 17)





Patient Home Medication List


Home Medication List Reviewed:  Yes


Discontinued Medications


Azithromycin (Zithromax) 500 Mg Tablet, 500 MG PO DAILY


   Discontinued Reason: No Longer Taking


   Prescribed by: TUSHAR DENNY on 22 114


   Last Action: Discontinued


Benzonatate (Tessalon Perles) 100 Mg Capsule, 200 MG PO TID


   Discontinued Reason: No Longer Taking


   Prescribed by: TUSHAR DENNY on 22 114


   Last Action: Discontinued


Cefdinir (Cefdinir) 300 Mg Capsule, 300 MG PO BID


   Discontinued Reason: No Longer Taking


   Prescribed by: TUSHAR DENNY on 22 1144


   Last Action: Discontinued


Cyclobenzaprine HCl (Cyclobenzaprine HCl) 5 Mg Tablet, 5 MG PO TID PRN for PAIN-

MODERATE


   Discontinued Reason: No Longer Taking


   Prescribed by: MARGE GARCIA on 17 1754


   Last Action: Discontinued


Guaifenesin/Dextromethorphan (Mucinex Dm ER 1,200-60 mg Tab) 1,200 Mg-60 Mg 

Tbmp.12hr, 1 EACH PO BID


   Discontinued Reason: No Longer Taking


   Prescribed by: TUSHAR DENNY on 22 1144


   Last Action: Discontinued


Hydrocodone Bit/Acetaminophen (HYDROcodone/APAP 7.5/325 TAB) 1 Each Tablet, 1 

EACH PO Q4H PRN for pain


   Discontinued Reason: No Longer Taking


   Prescribed by: BHARTI HERNANDEZ on 18 1406


   Last Action: Discontinued


Ondansetron (Ondansetron Odt) 8 Mg Tab.rapdis, 8 MG PO Q6H


   Discontinued Reason: No Longer Taking


   Prescribed by: TUSHAR DENNY on 22 1144


   Last Action: Discontinued





Review of Systems


Review of Systems


Constitutional:  No chills, No dizziness, No fever


Respiratory:  Denies Cough, Denies Shortness of Air


Cardiovascular:  Denies Chest Pain, Denies Palpitations


Gastrointestinal:  Abdominal Pain; Denies Constipated, Denies Diarrhea, Denies 

Nausea, Denies Vomiting


Genitourinary:  Denies Burning, Denies Frequency


Musculoskeletal:  no symptoms reported


Skin:  no symptoms reported





All Other Systems Reviewed


Negative Unless Noted:  Yes





Past Medical-Social-Family Hx


Patient Social History


Tobacco Use?:  Yes


Smoking Status:  Current Everyday Smoker


Substance use?:  Yes


Substance type:  Marijuana


Alcohol Use?:  No





Immunizations Up To Date


First/Initial COVID19 Vaccinat:  


Second COVID19 Vaccination Chai:  UNKNOWN


COVID19 Vaccine :  MODERNA





Past Medical History


Surgery/Hospitalization HX:  


none


Surgeries:  No


Respiratory:  No


Cardiac:  No


Neurological:  No


Genitourinary:  No


Gastrointestinal:  No


Musculoskeletal:  No


Endocrine:  No


HEENT:  No


Cancer:  No


Psychosocial:  Yes (POLYSUBSTANCE ABUSE)


Integumentary:  No


Blood Disorders:  No





Family Medical History


Reviewed Nursing Family Hx


No Pertinent Family Hx








SOCIAL HISTORY:


-SMOKES 1/2- 1 PPD


-DRUGS, + IV HEROIN USE, DAILY THC USE


-DENIES ALCOHOL USE





Physical Exam


Vital Signs





Vital Signs - First Documented








 22





 16:26


 


Temp 37.0


 


Pulse 80


 


Resp 16


 


B/P (MAP) 154/93 (113)


 


Pulse Ox 100


 


O2 Delivery Room Air





Capillary Refill : Less Than 3 Seconds


Height/Weight/BMI


Height: 5'11.00"


Weight: 160lbs. oz. 72.884923gp; 23.00 BMI


Method:Stated


General Appearance:  other (anxious)


Neck:  non-tender, full range of motion, supple, normal inspection


Respiratory:  chest non-tender, lungs clear, normal breath sounds, no 

respiratory distress, no accessory muscle use


Cardiovascular:  regular rate, rhythm, no edema


Gastrointestinal:  normal bowel sounds, soft, no organomegaly, no pulsatile 

mass, tenderness (epigastric, RUQ)


Extremities:  normal range of motion, non-tender, normal inspection


Neurologic/Psychiatric:  alert, normal mood/affect, oriented x 3


Skin:  normal color, warm/dry





Progress/Results/Core Measures


Results/Orders


Lab Results





Laboratory Tests








Test


 22


16:55 22


17:23 Range/Units


 


 


White Blood Count


 8.8 


 


 4.3-11.0


10^3/uL


 


Red Blood Count


 5.43 


 


 4.30-5.52


10^6/uL


 


Hemoglobin 16.7   13.3-17.7  g/dL


 


Hematocrit 47   40-54  %


 


Mean Corpuscular Volume 87   80-99  fL


 


Mean Corpuscular Hemoglobin 31   25-34  pg


 


Mean Corpuscular Hemoglobin


Concent 36 


 


 32-36  g/dL





 


Red Cell Distribution Width 12.1   10.0-14.5  %


 


Platelet Count


 239 


 


 130-400


10^3/uL


 


Mean Platelet Volume 10.0   9.0-12.2  fL


 


Immature Granulocyte % (Auto) 0    %


 


Neutrophils (%) (Auto) 75   42-75  %


 


Lymphocytes (%) (Auto) 17   12-44  %


 


Monocytes (%) (Auto) 7   0-12  %


 


Eosinophils (%) (Auto) 0   0-10  %


 


Basophils (%) (Auto) 1   0-10  %


 


Neutrophils # (Auto)


 6.6 


 


 1.8-7.8


10^3/uL


 


Lymphocytes # (Auto)


 1.5 


 


 1.0-4.0


10^3/uL


 


Monocytes # (Auto)


 0.6 


 


 0.0-1.0


10^3/uL


 


Eosinophils # (Auto)


 0.0 


 


 0.0-0.3


10^3/uL


 


Basophils # (Auto)


 0.1 


 


 0.0-0.1


10^3/uL


 


Immature Granulocyte # (Auto)


 0.0 


 


 0.0-0.1


10^3/uL


 


Sodium Level 142   135-145  MMOL/L


 


Potassium Level 3.7   3.6-5.0  MMOL/L


 


Chloride Level 104     MMOL/L


 


Carbon Dioxide Level 30   21-32  MMOL/L


 


Anion Gap 8   5-14  MMOL/L


 


Blood Urea Nitrogen 9   7-18  MG/DL


 


Creatinine


 1.00 


 


 0.60-1.30


MG/DL


 


Estimat Glomerular Filtration


Rate 100 


 


  





 


BUN/Creatinine Ratio 9    


 


Glucose Level 109 H    MG/DL


 


Calcium Level 11.0 H  8.5-10.1  MG/DL


 


Corrected Calcium    8.5-10.1  MG/DL


 


Total Bilirubin 0.6   0.1-1.0  MG/DL


 


Aspartate Amino Transf


(AST/SGOT) 13 


 


 5-34  U/L





 


Alanine Aminotransferase


(ALT/SGPT) 17 


 


 0-55  U/L





 


Alkaline Phosphatase 61     U/L


 


Total Protein 7.7   6.4-8.2  GM/DL


 


Albumin 4.9 H  3.2-4.5  GM/DL


 


Lipase 12   8-78  U/L


 


Urine Color  YELLOW   


 


Urine Clarity  CLOUDY   


 


Urine pH  7.5  5-9  


 


Urine Specific Gravity  1.010 L 1.016-1.022  


 


Urine Protein  NEGATIVE  NEGATIVE  


 


Urine Glucose (UA)  NEGATIVE  NEGATIVE  


 


Urine Ketones  NEGATIVE  NEGATIVE  


 


Urine Nitrite  NEGATIVE  NEGATIVE  


 


Urine Bilirubin  NEGATIVE  NEGATIVE  


 


Urine Urobilinogen  0.2  < = 1.0  MG/DL


 


Urine Leukocyte Esterase  NEGATIVE  NEGATIVE  


 


Urine RBC (Auto)  NEGATIVE  NEGATIVE  


 


Urine RBC  NONE   /HPF


 


Urine WBC  NONE   /HPF


 


Urine Squamous Epithelial


Cells 


 NONE 


  /HPF





 


Urine Crystals  PRESENT H  /LPF


 


Urine Amorphous Sediment


 


 LARGE TRIP


PHOSPHATE H  /LPF





 


Urine Bacteria  NEGATIVE   /HPF


 


Urine Casts  NONE   /LPF


 


Urine Mucus  NEGATIVE   /LPF


 


Urine Culture Indicated  NO   


 


Urine Opiates Screen  POSITIVE H NEGATIVE  


 


Urine Oxycodone Screen  NEGATIVE  NEGATIVE  


 


Urine Methadone Screen  NEGATIVE  NEGATIVE  


 


Urine Propoxyphene Screen  NEGATIVE  NEGATIVE  


 


Urine Barbiturates Screen  NEGATIVE  NEGATIVE  


 


Ur Tricyclic Antidepressants


Screen 


 NEGATIVE 


 NEGATIVE  





 


Urine Phencyclidine Screen  NEGATIVE  NEGATIVE  


 


Urine Amphetamines Screen  NEGATIVE  NEGATIVE  


 


Urine Methamphetamines Screen  NEGATIVE  NEGATIVE  


 


Urine Benzodiazepines Screen  NEGATIVE  NEGATIVE  


 


Urine Cocaine Screen  NEGATIVE  NEGATIVE  


 


Urine Cannabinoids Screen  POSITIVE H NEGATIVE  








My Orders





Orders - AYAN CARMONA


Cbc With Automated Diff (22 16:49)


Comprehensive Metabolic Panel (22 16:49)


Lipase (22 16:49)


Ns Iv 1000 Ml (Sodium Chloride 0.9%) (22 17:00)


Ketorolac Injection (Toradol Injection) (22 17:00)


Ct Abdomen/Pelvis W (22 16:51)


Iohexol Injection (Omnipaque 350 Mg/Ml 1 (22 17:15)


Ns (Ivpb) (Sodium Chloride 0.9% Ivpb Bag (22 17:15)


Ua Culture If Indicated (22 17:41)


Drug Screen Stat (Urine) (22 17:41)





Medications Given in ED





Vital Signs/I&O











 22





 16:26 18:20


 


Temp 37.0 


 


Pulse 80 61


 


Resp 16 18


 


B/P (MAP) 154/93 (113) 124/79


 


Pulse Ox 100 98


 


O2 Delivery Room Air Room Air














Blood Pressure Mean:                    113











Progress


Progress Note :  


Progress Note


Labs were reassuring at this time. Of note, his drug screen had opiates and THC.

CT was negative for acute processes. I think this helped calm down his anxiety. 

Home instructions reviewed with patient along with reasons to return to the 

emergency department. No signs of acute abdomen were appreciated on exam.





Diagnostic Imaging





   Diagonstic Imaging:  CT


   Plain Films/CT/US/NM/MRI:  abdomen, pelvis


Comments


NAME:   SUE ROSALES


[facility] REC#:   [id number]


ACCOUNT#:   [id number]


PT STATUS:   REG ER


:   1985


PHYSICIAN:   AYAN CARMONA


ADMIT DATE:   22/ER


                                  ***Signed***


Date of Exam:22





CT ABDOMEN/PELVIS W








EXAMINATION: CT abdomen and pelvis with intravenous contrast.





TECHNIQUE: Multiple contiguous axial images were obtained through


the abdomen and pelvis after the uneventful administration of


intravenous contrast. All CT scans use one or more of the


following dose optimizing techniques: automated exposure control,


MA and/or KvP adjustment based on patient size and exam type or


iterative reconstruction. 





HISTORY: Mid abdominal pain. Vomiting.





COMPARISON: Gallbladder ultrasound on 10/28/2022.





FINDINGS:





The heart is unremarkable. The included lung bases are clear.  





The liver, spleen, pancreas, adrenal glands, and kidneys have a


normal appearance. The gallbladder is nondistended. There is no


pathologically enlarged mesenteric or retroperitoneal adenopathy.








The bowel loops are nondilated. The appendix is visualized in the


right lower quadrant and has a normal appearance. There is no


free fluid or free air. 





No acute osseous abnormalities. Endplate sclerotic changes are


visualized at the L5-S1 level.





Ureters and bladder are grossly normal. There is no free air,


loculated collection, or adenopathy in the pelvis. 





IMPRESSION:


1. No acute abnormality in the abdomen and pelvis. No bowel


obstruction. No free fluid or free air. Normal appendix.





Dictated by: 





  Dictated on workstation # [id number]








Dict:   22


Trans:   22


CV [id number]





Interpreted by:     SONIA JOHNSON DO


Electronically signed by: SONIA JOHNSON DO 22





Departure


Impression





   Primary Impression:  


   Abdominal pain


   Qualified Codes:  R10.11 - Right upper quadrant pain


Disposition:  01 HOME, SELF-CARE


Condition:  Stable





Departure-Patient Inst.


Decision time for Depature:  18:06


Referrals:  


NO,LOCAL PHYSICIAN (PCP/Family)


Primary Care Physician


Patient Instructions:  Abdominal Pain, Adult ED





Add. Discharge Instructions:  


1. Home and rest.


2. Push fluids.


3. Alternate Tylenol/Ibuprofen as needed for pain.


4. Follow up with PCP as needed.


5. Return here if worse or concerns.





All discharge instructions reviewed with patient and/or family. Voiced 

understanding.











AYAN CARMONA        2022 16:44

## 2022-11-02 NOTE — DIAGNOSTIC IMAGING REPORT
EXAMINATION: CT abdomen and pelvis with intravenous contrast.



TECHNIQUE: Multiple contiguous axial images were obtained through

the abdomen and pelvis after the uneventful administration of

intravenous contrast. All CT scans use one or more of the

following dose optimizing techniques: automated exposure control,

MA and/or KvP adjustment based on patient size and exam type or

iterative reconstruction. 



HISTORY: Mid abdominal pain. Vomiting.



COMPARISON: Gallbladder ultrasound on 10/28/2022.



FINDINGS:



The heart is unremarkable. The included lung bases are clear.  



The liver, spleen, pancreas, adrenal glands, and kidneys have a

normal appearance. The gallbladder is nondistended. There is no

pathologically enlarged mesenteric or retroperitoneal adenopathy.





The bowel loops are nondilated. The appendix is visualized in the

right lower quadrant and has a normal appearance. There is no

free fluid or free air. 



No acute osseous abnormalities. Endplate sclerotic changes are

visualized at the L5-S1 level.



Ureters and bladder are grossly normal. There is no free air,

loculated collection, or adenopathy in the pelvis. 



IMPRESSION:

1. No acute abnormality in the abdomen and pelvis. No bowel

obstruction. No free fluid or free air. Normal appendix.



Dictated by: 



  Dictated on workstation # [id number]